# Patient Record
Sex: FEMALE | Race: WHITE
[De-identification: names, ages, dates, MRNs, and addresses within clinical notes are randomized per-mention and may not be internally consistent; named-entity substitution may affect disease eponyms.]

---

## 2021-08-26 ENCOUNTER — HOSPITAL ENCOUNTER (EMERGENCY)
Age: 36
Discharge: HOME | End: 2021-08-26
Payer: SELF-PAY

## 2021-08-26 VITALS
HEART RATE: 71 BPM | DIASTOLIC BLOOD PRESSURE: 77 MMHG | OXYGEN SATURATION: 99 % | RESPIRATION RATE: 18 BRPM | SYSTOLIC BLOOD PRESSURE: 123 MMHG

## 2021-08-26 VITALS
OXYGEN SATURATION: 100 % | HEART RATE: 56 BPM | SYSTOLIC BLOOD PRESSURE: 118 MMHG | DIASTOLIC BLOOD PRESSURE: 76 MMHG | RESPIRATION RATE: 16 BRPM

## 2021-08-26 VITALS
RESPIRATION RATE: 16 BRPM | HEART RATE: 76 BPM | SYSTOLIC BLOOD PRESSURE: 129 MMHG | OXYGEN SATURATION: 96 % | DIASTOLIC BLOOD PRESSURE: 74 MMHG | TEMPERATURE: 98.42 F

## 2021-08-26 VITALS — BODY MASS INDEX: 27.2 KG/M2

## 2021-08-26 DIAGNOSIS — M54.16: Primary | ICD-10-CM

## 2021-08-26 LAB — SP GR UR: 1 (ref 1–1.03)

## 2021-08-26 PROCEDURE — 99283 EMERGENCY DEPT VISIT LOW MDM: CPT

## 2021-08-26 PROCEDURE — 96372 THER/PROPH/DIAG INJ SC/IM: CPT

## 2021-08-26 PROCEDURE — 81001 URINALYSIS AUTO W/SCOPE: CPT

## 2021-08-26 PROCEDURE — 72131 CT LUMBAR SPINE W/O DYE: CPT

## 2024-02-24 ENCOUNTER — HOSPITAL ENCOUNTER (EMERGENCY)
Facility: HOSPITAL | Age: 39
Discharge: HOME | End: 2024-02-24
Attending: INTERNAL MEDICINE

## 2024-02-24 VITALS
TEMPERATURE: 98 F | SYSTOLIC BLOOD PRESSURE: 128 MMHG | BODY MASS INDEX: 30.52 KG/M2 | WEIGHT: 151.38 LBS | DIASTOLIC BLOOD PRESSURE: 72 MMHG | HEART RATE: 74 BPM | OXYGEN SATURATION: 99 % | RESPIRATION RATE: 17 BRPM | HEIGHT: 59 IN

## 2024-02-24 DIAGNOSIS — R07.9 CHEST PAIN: ICD-10-CM

## 2024-02-24 DIAGNOSIS — R07.89 CHEST WALL PAIN: Primary | ICD-10-CM

## 2024-02-24 PROCEDURE — 99283 EMERGENCY DEPT VISIT LOW MDM: CPT

## 2024-02-24 RX ORDER — BUSPIRONE HYDROCHLORIDE 10 MG/1
10 TABLET ORAL 2 TIMES DAILY PRN
COMMUNITY
Start: 2024-01-11

## 2024-02-24 RX ORDER — NAPROXEN 500 MG/1
500 TABLET ORAL 2 TIMES DAILY WITH MEALS
Qty: 30 TABLET | Refills: 0 | Status: SHIPPED | OUTPATIENT
Start: 2024-02-24 | End: 2024-03-10

## 2024-02-24 RX ORDER — FLUOXETINE HYDROCHLORIDE 40 MG/1
40 CAPSULE ORAL EVERY MORNING
COMMUNITY
Start: 2024-01-11

## 2024-02-24 NOTE — ED PROVIDER NOTES
02/24/2024         2:32 PM    Source of History:  History obtained from the patient.     Chief complaint:  From Nurse Triage:  Chest Pain (C/o chest pain x 1 hour while shoveling dirt)    HISTORY OF PRESENT ILLNES:  Lisa Riojas is a 38 y.o. female  has a past medical history of Anxiety disorder, unspecified and Depression. presenting with Chest Pain (C/o chest pain x 1 hour while shoveling dirt)      REVIEW OF SYSTEMS:   Constitutional symptoms:  No Fever. No Chills    Skin symptoms:  No Rash.    Eye symptoms:  No Visual disturbance reported.   ENMT symptoms:  No Sore throat,    Respiratory symptoms:  No Shortness of Breath, no Cough, no Wheezing.    Cardiovascular symptoms:  Left Chest Pain, No Palpitations.   Gastrointestinal symptoms:  No Abdominal Pain, No Nausea, No Vomiting, No Diarrhea, No Constipation.    Genitourinary symptoms:  No Dysuria,    Musculoskeletal symptoms:  No Back pain,    Neurologic symptoms:  No Headache, No Dizziness.    Psychiatric symptoms:  No Anxiety, No Depression, No Substance Abuse.              Additional review of systems information: Patient Denies Any Other Complaints.    All Other Systems Reviewed With Patient And Negative.    ALLEGIES:  Review of patient's allergies indicates:  No Known Allergies    MEDICINE LIST:  Current Outpatient Medications   Medication Instructions    busPIRone (BUSPAR) 10 mg, Oral, 2 times daily PRN    FLUoxetine 40 mg, Oral, Every morning    naproxen (NAPROSYN) 500 mg, Oral, 2 times daily with meals        PMH:  As per HPI and below:    Reviewed and updated in chart.    PAST MEDICAL HISTORY:  Past Medical History:   Diagnosis Date    Anxiety disorder, unspecified     Depression         PAST SURGICAL HISTORY:  Past Surgical History:   Procedure Laterality Date    BILATERAL TUBAL LIGATION Bilateral        SOCIAL HISTORY:  Social History     Tobacco Use    Smoking status: Every Day     Types: Cigarettes    Smokeless tobacco: Never   Substance Use Topics     Alcohol use: Not Currently    Drug use: Never       FAMILY HISTORY:  Family History   Problem Relation Age of Onset    Hyperlipidemia Mother     Heart disease Mother     Hypertension Mother     Hyperlipidemia Father     Hypertension Father     Heart disease Father     Diabetes Maternal Grandmother         PROBLEM LIST:  There is no problem list on file for this patient.       PHYSICAL EXAM:      ED Triage Vitals [02/24/24 1416]   BP (!) 176/90   Pulse 74   Resp 18   Temp 98.2 °F (36.8 °C)   SpO2 100 %        Vital Signs: Reviewed As In Chart.  General:  Alert, No Cardiorespiratory Distress Noted.   Eye:  Extraocular Movements Are Intact.   ENT: Mucus membranes are moist.   Cardiovascular:  Regular Rate And Rhythm, No Murmur, No Pedal Edema.    Respiratory:  Tenderness along the left sternal border, Respirations Nonlabored, No Respiratory Distress, Good Bilateral Air Entry, No Rales, No Rhonchi.    Gastrointestinal:  Soft, Non Distended, Non Tenderness, Normal Bowel Sounds.    Neurological:  Alert And Oriented To Person, Place, Time, And Situation, Normal Motor Observed, Normal Speech Observed.  Musculoskeletal:  No Gross Deformity Noted.     Psychiatric:  Cooperative.      ED WORKUP FOR MEDICAL DECISION MAKING:    ED ORDERS:  Orders Placed This Encounter   Procedures    EKG 12-lead       ED MEDICINES:  Medications - No data to display         ECG Results              EKG 12-lead (Preliminary result)  Result time 02/24/24 14:33:41      Wet Read by Hemant rAauz MD (02/24/24 14:33:41, [facility] - Emergency Dept, Emergency Medicine)    EKG Initial Reading: Independently Interpreted by Hemant Arauz MD. independently as: No STEMI. Rhythm: Normal Sinus Rhythm, Rate 72. Ectopy: No Ectopy. Conduction: Normal. ST Segments: Normal ST Segments. T Waves: Normal. Axis: Normal.                                     ED LABS ORDERED AND REVIEWED:  No results found for any previous visit.       RADIOLOGY  STUDIES ORDERED AND REVIEWED:  Imaging Results    None         MEDICAL DECISION MAKING:    Lisa Riojas is 38 y.o. female who  has a past medical history of Anxiety disorder, unspecified and Depression. arrives in ER with c/o Chest Pain (C/o chest pain x 1 hour while shoveling dirt)  Patient says she was shoveling dirt outside, and developed pain in the left chest, she says she stopped laid down for 15 minutes but was not getting better, then she went and laid down inside for some time, but is not getting better so she decided to come to the emergency room.  She says every time she moves it hurts, denies any other associated symptoms.    Reviewed Nurses Note. Reviewed Vital Signs.     Reviewed Pertinent old records, History and updated as necessary.    Vitals:    02/24/24 1431   BP:    Pulse: 74   Resp: 17   Temp:         Medical Decision Making            ED Course as of 02/24/24 1435   Sat Feb 24, 2024   1434 Patient has reproducible anterior chest wall pain, her EKG is perfectly normal, her blood pressure is down to almost normal, heart rate is in 50s, O2 sat is maintained, she started having chest pain while she was shoveling and it hurts when she moves, it hurts when I touch it, I will prescribe her naproxen for that but I advised her that she can talk to a cardiologist and they can do a stress test on her she is going to talk to her family doctor. [GQ]   1435 BP: 128/72 [GQ]   1435 Pulse: 73 [GQ]   1435 Resp: 19 [GQ]   1435 SpO2: 99 % [GQ]      ED Course User Index  [GQ] Hemant Arauz MD            PROCEDURES PERFORMED IN ED:  Procedures    DIAGNOSTIC IMPRESSION:        ICD-10-CM ICD-9-CM   1. Chest wall pain  R07.89 786.52   2. Chest pain  R07.9 786.50         ED Disposition Condition    Discharge Stable               Medication List        START taking these medications      naproxen 500 MG tablet  Commonly known as: NAPROSYN  Take 1 tablet (500 mg total) by mouth 2 (two) times daily with meals. for 15  days            ASK your doctor about these medications      busPIRone 10 MG tablet  Commonly known as: BUSPAR     FLUoxetine 40 MG capsule               Where to Get Your Medications        You can get these medications from any pharmacy    Bring a paper prescription for each of these medications  naproxen 500 MG tablet           Follow-up Information       PMD In 2 days.               Call  Cardiologist.                              ED Prescriptions       Medication Sig Dispense Start Date End Date Auth. Provider    naproxen (NAPROSYN) 500 MG tablet Take 1 tablet (500 mg total) by mouth 2 (two) times daily with meals. for 15 days 30 tablet 2/24/2024 3/10/2024 Hemant Arauz MD          Follow-up Information       Follow up With Specialties Details Why Contact Info    PMD  In 2 days      Cardiologist  Call                  Hemant Arauz MD  02/24/24 [id number]

## 2024-04-07 ENCOUNTER — HOSPITAL ENCOUNTER (EMERGENCY)
Facility: HOSPITAL | Age: 39
Discharge: HOME | End: 2024-04-07
Attending: INTERNAL MEDICINE

## 2024-04-07 VITALS
BODY MASS INDEX: 31.01 KG/M2 | OXYGEN SATURATION: 98 % | SYSTOLIC BLOOD PRESSURE: 129 MMHG | HEIGHT: 59 IN | DIASTOLIC BLOOD PRESSURE: 73 MMHG | HEART RATE: 69 BPM | TEMPERATURE: 99 F | WEIGHT: 153.81 LBS | RESPIRATION RATE: 20 BRPM

## 2024-04-07 DIAGNOSIS — K08.89 TOOTHACHE: Primary | ICD-10-CM

## 2024-04-07 PROCEDURE — 96372 THER/PROPH/DIAG INJ SC/IM: CPT | Performed by: NURSE PRACTITIONER

## 2024-04-07 PROCEDURE — 63600175 PHARM REV CODE 636 W HCPCS: Performed by: NURSE PRACTITIONER

## 2024-04-07 PROCEDURE — 99284 EMERGENCY DEPT VISIT MOD MDM: CPT | Mod: 25

## 2024-04-07 RX ORDER — IBUPROFEN 600 MG/1
600 TABLET ORAL EVERY 6 HOURS PRN
Qty: 20 TABLET | Refills: 0 | Status: SHIPPED | OUTPATIENT
Start: 2024-04-07

## 2024-04-07 RX ORDER — KETOROLAC TROMETHAMINE 30 MG/ML
60 INJECTION, SOLUTION INTRAMUSCULAR; INTRAVENOUS
Status: COMPLETED | OUTPATIENT
Start: 2024-04-07 | End: 2024-04-07

## 2024-04-07 RX ORDER — AMOXICILLIN 875 MG/1
875 TABLET, FILM COATED ORAL 2 TIMES DAILY
Qty: 20 TABLET | Refills: 0 | Status: SHIPPED | OUTPATIENT
Start: 2024-04-07 | End: 2024-04-17

## 2024-04-07 RX ORDER — TRAMADOL HYDROCHLORIDE 50 MG/1
50 TABLET ORAL EVERY 6 HOURS PRN
Qty: 12 TABLET | Refills: 0 | Status: SHIPPED | OUTPATIENT
Start: 2024-04-07

## 2024-04-07 RX ORDER — CHLORHEXIDINE GLUCONATE ORAL RINSE 1.2 MG/ML
15 SOLUTION DENTAL 2 TIMES DAILY
Qty: 420 ML | Refills: 0 | Status: SHIPPED | OUTPATIENT
Start: 2024-04-07 | End: 2024-04-21

## 2024-04-07 RX ADMIN — KETOROLAC TROMETHAMINE 60 MG: 30 INJECTION, SOLUTION INTRAMUSCULAR at 03:04

## 2024-04-07 NOTE — ED PROVIDER NOTES
Encounter Date: 2024       History     Chief Complaint   Patient presents with    Dental Pain     C/o dental pain to multiple teeth.      Patient is a 38-year-old female who presents to the emergency department with dental pain that has been ongoing for years.  She has poor dentition throughout only has 6 teeth remaining on the lower jaw.  Full dentures to the upper jaw.  Lower jaw has tooth # 27, 25 come 24, 23, 22 remaining.  There is a mild amount of swelling to the soft tissue in the chin.  She denies fevers chills.  She denies any other complaints or associated symptoms at this time.  Denies seeing dentist as she was not from here and is supposed to be heading back to her home town [place] very [place] very soon.      Review of patient's allergies indicates:  No Known Allergies  Past Medical History:   Diagnosis Date    Anxiety disorder, unspecified     Depression      Past Surgical History:   Procedure Laterality Date    BILATERAL TUBAL LIGATION Bilateral      SECTION       Family History   Problem Relation Age of Onset    Hyperlipidemia Mother     Heart disease Mother     Hypertension Mother     Hyperlipidemia Father     Hypertension Father     Heart disease Father     Diabetes Maternal Grandmother      Social History     Tobacco Use    Smoking status: Every Day     Types: Cigarettes    Smokeless tobacco: Never   Substance Use Topics    Alcohol use: Not Currently    Drug use: Never     Review of Systems   Constitutional:  Negative for activity change, appetite change and fever.   HENT:  Positive for dental problem. Negative for congestion, nosebleeds, postnasal drip, rhinorrhea and sore throat.    Eyes:  Negative for discharge and itching.   Respiratory:  Negative for apnea, chest tightness and shortness of breath.    Cardiovascular:  Negative for chest pain.   Gastrointestinal:  Negative for abdominal distention, abdominal pain and nausea.   Genitourinary:  Negative for dysuria, vaginal bleeding,  vaginal discharge and vaginal pain.   Musculoskeletal:  Negative for back pain.   Skin:  Negative for rash.   Neurological:  Negative for dizziness, facial asymmetry and weakness.   Hematological:  Does not bruise/bleed easily.   Psychiatric/Behavioral:  Negative for agitation and behavioral problems.    All other systems reviewed and are negative.      Physical Exam     Initial Vitals [04/07/24 1527]   BP Pulse Resp Temp SpO2   129/73 69 20 98.6 °F (37 °C) 98 %      MAP       --         Physical Exam    Nursing note and vitals reviewed.  Constitutional: Vital signs are normal. She appears well-developed and well-nourished.  Non-toxic appearance. She does not have a sickly appearance.   HENT:   Head: Normocephalic and atraumatic.   Right Ear: External ear normal.   Left Ear: External ear normal.   Only tooth number 27, 25, 24, 23, 22 still remaining on the lower jaw.  Dentures to the upper teeth.  Minimal amount of swelling erythema no major edema.  Soft tissue swelling noted to the chin.  No other abnormality seen.   Eyes: Conjunctivae, EOM and lids are normal. Lids are everted and swept, no foreign bodies found.   Neck: Trachea normal and phonation normal. Neck supple. No thyroid mass and no thyromegaly present.   Normal range of motion.   Full passive range of motion without pain.     Cardiovascular:  Normal rate, regular rhythm, S1 normal, S2 normal, normal heart sounds, intact distal pulses and normal pulses.           Musculoskeletal:      Cervical back: Full passive range of motion without pain, normal range of motion and neck supple.     Lymphadenopathy:     She has no cervical adenopathy.   Neurological: She is alert and oriented to person, place, and time. She has normal strength and normal reflexes.   Skin: Skin is warm, dry and intact. Capillary refill takes less than 2 seconds.   Psychiatric: She has a normal mood and affect. Her speech is normal and behavior is normal. Judgment normal. Cognition and  memory are normal.         ED Course   Procedures  Labs Reviewed - No data to display       Imaging Results    None          Medications   ketorolac injection 60 mg (has no administration in time range)     Medical Decision Making  Patient is a 38-year-old female who presents to the emergency department with dental pain that has been ongoing for years.  She has poor dentition throughout only has 6 teeth remaining on the lower jaw.  Full dentures to the upper jaw.  Lower jaw has tooth # 27, 25 come 24, 23, 22 remaining.  There is a mild amount of swelling to the soft tissue in the chin.  She denies fevers chills.  She denies any other complaints or associated symptoms at this time.  Denies seeing dentist as she was not from here and is supposed to be heading back to her home town in his very show very soon.    Problems Addressed:  Toothache: acute illness or injury     Details: Patient has very poor dentition is only has 6 teeth remaining lower jaw.  There is a minimal amount of swelling to the soft tissue the chin insert minimal swelling of the gums so I can not rule out abscess without dental x-rays.  Will cover with antibiotics and give medication for the pain and some mouthwash to keep oral mucosa clean and moist.  Discussed follow up with dentist.  Strict ED return precautions discussed for any change or worsening symptoms.    Risk  Prescription drug management.                                      Clinical Impression:  Final diagnoses:  [K08.89] Toothache (Primary)          ED Disposition Condition    Discharge Stable          ED Prescriptions       Medication Sig Dispense Start Date End Date Auth. Provider    ibuprofen (ADVIL,MOTRIN) 600 MG tablet Take 1 tablet (600 mg total) by mouth every 6 (six) hours as needed for Pain. 20 tablet 4/7/2024 -- Castro Dunn FNP    traMADoL (ULTRAM) 50 mg tablet Take 1 tablet (50 mg total) by mouth every 6 (six) hours as needed for Pain. 12 tablet 4/7/2024 -- Shaun  MIGUEL Yen    chlorhexidine (PERIDEX) 0.12 % solution Use as directed 15 mLs in the mouth or throat 2 (two) times daily. for 14 days 420 mL 4/7/2024 4/21/2024 Castro Dunn FNP    amoxicillin (AMOXIL) 875 MG tablet Take 1 tablet (875 mg total) by mouth 2 (two) times daily. for 10 days 20 tablet 4/7/2024 4/17/2024 Castro Dunn FNP          Follow-up Information    None          Castro Dunn FNP  04/07/24 2355

## 2024-11-21 ENCOUNTER — HOSPITAL ENCOUNTER (EMERGENCY)
Facility: HOSPITAL | Age: 39
Discharge: HOME | End: 2024-11-21
Attending: EMERGENCY MEDICINE

## 2024-11-21 VITALS
DIASTOLIC BLOOD PRESSURE: 70 MMHG | RESPIRATION RATE: 19 BRPM | HEIGHT: 59 IN | HEART RATE: 88 BPM | WEIGHT: 168 LBS | BODY MASS INDEX: 33.87 KG/M2 | OXYGEN SATURATION: 97 % | SYSTOLIC BLOOD PRESSURE: 121 MMHG | TEMPERATURE: 98 F

## 2024-11-21 DIAGNOSIS — J40 BRONCHITIS: Primary | ICD-10-CM

## 2024-11-21 DIAGNOSIS — J45.909 ASTHMA: ICD-10-CM

## 2024-11-21 LAB
ALBUMIN SERPL-MCNC: 3.6 G/DL (ref 3.5–5)
ALBUMIN/GLOB SERPL: 1.1 RATIO (ref 1.1–2)
ALP SERPL-CCNC: 69 UNIT/L (ref 40–150)
ALT SERPL-CCNC: 11 UNIT/L (ref 0–55)
ANION GAP SERPL CALC-SCNC: 6 MEQ/L
AST SERPL-CCNC: 18 UNIT/L (ref 5–34)
BASOPHILS # BLD AUTO: 0.06 X10(3)/MCL
BASOPHILS NFR BLD AUTO: 1.1 %
BILIRUB SERPL-MCNC: 0.1 MG/DL
BUN SERPL-MCNC: 9 MG/DL (ref 7–18.7)
CALCIUM SERPL-MCNC: 9.1 MG/DL (ref 8.4–10.2)
CHLORIDE SERPL-SCNC: 108 MMOL/L (ref 98–107)
CO2 SERPL-SCNC: 26 MMOL/L (ref 22–29)
CREAT SERPL-MCNC: 0.71 MG/DL (ref 0.55–1.02)
CREAT/UREA NIT SERPL: 13
EOSINOPHIL # BLD AUTO: 0.24 X10(3)/MCL (ref 0–0.9)
EOSINOPHIL NFR BLD AUTO: 4.5 %
ERYTHROCYTE [DISTWIDTH] IN BLOOD BY AUTOMATED COUNT: 13.9 % (ref 11.5–17)
FLUAV AG UPPER RESP QL IA.RAPID: NOT DETECTED
FLUBV AG UPPER RESP QL IA.RAPID: NOT DETECTED
GFR SERPLBLD CREATININE-BSD FMLA CKD-EPI: >60 ML/MIN/1.73/M2
GLOBULIN SER-MCNC: 3.3 GM/DL (ref 2.4–3.5)
GLUCOSE SERPL-MCNC: 65 MG/DL (ref 74–100)
HCT VFR BLD AUTO: 34.1 % (ref 37–47)
HGB BLD-MCNC: 10.7 G/DL (ref 12–16)
IMM GRANULOCYTES # BLD AUTO: 0.01 X10(3)/MCL (ref 0–0.04)
IMM GRANULOCYTES NFR BLD AUTO: 0.2 %
LIPASE SERPL-CCNC: 22 U/L
LYMPHOCYTES # BLD AUTO: 1.43 X10(3)/MCL (ref 0.6–4.6)
LYMPHOCYTES NFR BLD AUTO: 26.8 %
MCH RBC QN AUTO: 25.1 PG (ref 27–31)
MCHC RBC AUTO-ENTMCNC: 31.4 G/DL (ref 33–36)
MCV RBC AUTO: 80 FL (ref 80–94)
MONOCYTES # BLD AUTO: 0.71 X10(3)/MCL (ref 0.1–1.3)
MONOCYTES NFR BLD AUTO: 13.3 %
NEUTROPHILS # BLD AUTO: 2.89 X10(3)/MCL (ref 2.1–9.2)
NEUTROPHILS NFR BLD AUTO: 54.1 %
OHS QRS DURATION: 80 MS
OHS QTC CALCULATION: 412 MS
PLATELET # BLD AUTO: 241 X10(3)/MCL (ref 130–400)
PMV BLD AUTO: 11.2 FL (ref 7.4–10.4)
POTASSIUM SERPL-SCNC: 4.3 MMOL/L (ref 3.5–5.1)
PROT SERPL-MCNC: 6.9 GM/DL (ref 6.4–8.3)
RBC # BLD AUTO: 4.26 X10(6)/MCL (ref 4.2–5.4)
RSV A 5' UTR RNA NPH QL NAA+PROBE: NOT DETECTED
SARS-COV-2 RNA RESP QL NAA+PROBE: NOT DETECTED
SODIUM SERPL-SCNC: 140 MMOL/L (ref 136–145)
TROPONIN I SERPL-MCNC: <0.01 NG/ML (ref 0–0.04)
WBC # BLD AUTO: 5.34 X10(3)/MCL (ref 4.5–11.5)

## 2024-11-21 PROCEDURE — 83690 ASSAY OF LIPASE: CPT | Performed by: EMERGENCY MEDICINE

## 2024-11-21 PROCEDURE — 94640 AIRWAY INHALATION TREATMENT: CPT

## 2024-11-21 PROCEDURE — 25000242 PHARM REV CODE 250 ALT 637 W/ HCPCS: Performed by: EMERGENCY MEDICINE

## 2024-11-21 PROCEDURE — 99285 EMERGENCY DEPT VISIT HI MDM: CPT | Mod: 25

## 2024-11-21 PROCEDURE — 84484 ASSAY OF TROPONIN QUANT: CPT | Performed by: EMERGENCY MEDICINE

## 2024-11-21 PROCEDURE — 85025 COMPLETE CBC W/AUTO DIFF WBC: CPT | Performed by: EMERGENCY MEDICINE

## 2024-11-21 PROCEDURE — 93005 ELECTROCARDIOGRAM TRACING: CPT

## 2024-11-21 PROCEDURE — 93010 ELECTROCARDIOGRAM REPORT: CPT | Mod: ,,, | Performed by: INTERNAL MEDICINE

## 2024-11-21 PROCEDURE — 94760 N-INVAS EAR/PLS OXIMETRY 1: CPT

## 2024-11-21 PROCEDURE — 0241U COVID/RSV/FLU A&B PCR: CPT | Performed by: EMERGENCY MEDICINE

## 2024-11-21 PROCEDURE — 80053 COMPREHEN METABOLIC PANEL: CPT | Performed by: EMERGENCY MEDICINE

## 2024-11-21 RX ORDER — IPRATROPIUM BROMIDE AND ALBUTEROL SULFATE 2.5; .5 MG/3ML; MG/3ML
3 SOLUTION RESPIRATORY (INHALATION)
Status: COMPLETED | OUTPATIENT
Start: 2024-11-21 | End: 2024-11-21

## 2024-11-21 RX ORDER — PREDNISONE 20 MG/1
40 TABLET ORAL DAILY
Qty: 10 TABLET | Refills: 0 | Status: SHIPPED | OUTPATIENT
Start: 2024-11-21 | End: 2024-11-26

## 2024-11-21 RX ORDER — AMOXICILLIN AND CLAVULANATE POTASSIUM 875; 125 MG/1; MG/1
1 TABLET, FILM COATED ORAL 2 TIMES DAILY
Qty: 14 TABLET | Refills: 0 | Status: SHIPPED | OUTPATIENT
Start: 2024-11-21

## 2024-11-21 RX ADMIN — IPRATROPIUM BROMIDE AND ALBUTEROL SULFATE 3 ML: .5; 3 SOLUTION RESPIRATORY (INHALATION) at 09:11

## 2024-11-21 NOTE — ED PROVIDER NOTES
Encounter Date: 2024       History     Chief Complaint   Patient presents with    Shortness of Breath     C/o SOB and burning in her chest x 1 week     HPI  39-year-old female history of asthma, depression, previous  and tubal ligation now with complaints of one-week history of productive cough of yellow sputum, sinus congestion, fever, mild shortness of breath with exertion, chest tightness with no headache, nausea, vomiting, abdominal pain, dysuria, increasing leg edema or calf pain.  Patient is not vaccinated against COVID-19 or influenza.   Review of patient's allergies indicates:  No Known Allergies  Past Medical History:   Diagnosis Date    Anxiety disorder, unspecified     Asthma     Depression      Past Surgical History:   Procedure Laterality Date    BILATERAL TUBAL LIGATION Bilateral      SECTION       Family History   Problem Relation Name Age of Onset    Hyperlipidemia Mother      Heart disease Mother      Hypertension Mother      Hyperlipidemia Father      Hypertension Father      Heart disease Father      Diabetes Maternal Grandmother       Social History     Tobacco Use    Smoking status: Every Day     Types: Cigarettes    Smokeless tobacco: Never   Substance Use Topics    Alcohol use: Not Currently    Drug use: Never     Review of Systems   All other systems reviewed and are negative.      Physical Exam     Initial Vitals [24 0919]   BP Pulse Resp Temp SpO2   131/61 93 20 98.3 °F (36.8 °C) 99 %      MAP       --         Physical Exam    Nursing note and vitals reviewed.  Constitutional: She appears well-developed and well-nourished. She is cooperative.   HENT:   Head: Normocephalic and atraumatic.   Eyes: Conjunctivae and lids are normal.   Neck: Trachea normal. Neck supple.   Normal range of motion.  Cardiovascular:  Normal rate, regular rhythm, normal heart sounds and intact distal pulses.           Pulmonary/Chest: No respiratory distress. She has wheezes. She has no  rhonchi. She has no rales. She exhibits no tenderness.   Abdominal: Abdomen is soft. Bowel sounds are normal. There is no abdominal tenderness.   Musculoskeletal:         General: Normal range of motion.      Cervical back: Normal range of motion and neck supple.     Neurological: She is alert and oriented to person, place, and time. She has normal strength.   Skin: Skin is warm and dry. No rash noted.   Psychiatric: She has a normal mood and affect. Her speech is normal and behavior is normal. Judgment and thought content normal.         ED Course   Procedures  Labs Reviewed   COMPREHENSIVE METABOLIC PANEL - Abnormal       Result Value    Sodium 140      Potassium 4.3      Chloride 108 (*)     CO2 26      Glucose 65 (*)     Blood Urea Nitrogen 9.0      Creatinine 0.71      Calcium 9.1      Protein Total 6.9      Albumin 3.6      Globulin 3.3      Albumin/Globulin Ratio 1.1      Bilirubin Total 0.1      ALP 69      ALT 11      AST 18      eGFR >60      Anion Gap 6.0      BUN/Creatinine Ratio 13     CBC WITH DIFFERENTIAL - Abnormal    WBC 5.34      RBC 4.26      Hgb 10.7 (*)     Hct 34.1 (*)     MCV 80.0      MCH 25.1 (*)     MCHC 31.4 (*)     RDW 13.9      Platelet 241      MPV 11.2 (*)     Neut % 54.1      Lymph % 26.8      Mono % 13.3      Eos % 4.5      Basophil % 1.1      Lymph # 1.43      Neut # 2.89      Mono # 0.71      Eos # 0.24      Baso # 0.06      IG# 0.01      IG% 0.2     COVID/RSV/FLU A&B PCR - Normal    Influenza A PCR Not Detected      Influenza B PCR Not Detected      Respiratory Syncytial Virus PCR Not Detected      SARS-CoV-2 PCR Not Detected      Narrative:     The Xpert Xpress SARS-CoV-2/FLU/RSV plus is a rapid, multiplexed real-time PCR test intended for the simultaneous qualitative detection and differentiation of SARS-CoV-2, Influenza A, Influenza B, and respiratory syncytial virus (RSV) viral RNA in either nasopharyngeal swab or nasal swab specimens.         TROPONIN I - Normal    Troponin-I  <0.010     LIPASE - Normal    Lipase Level 22     CBC W/ AUTO DIFFERENTIAL    Narrative:     The following orders were created for panel order CBC auto differential.  Procedure                               Abnormality         Status                     ---------                               -----------         ------                     CBC with Differential[[id number]]       Abnormal            Final result                 Please view results for these tests on the individual orders.     EKG Readings: (Independently Interpreted)   Normal sinus rhythm 71, no acute ST elevation or ST depressions, normal axis.     ECG Results              EKG 12-lead (Final result)        Collection Time Result Time QRS Duration [facility] QTC Calculation    11/21/24 09:24:00 11/21/24 10:10:42 80 412                     Final result by Interface, Lab In [facility] (11/21/24 10:10:52)                   Narrative:    Test Reason : R06.00,    Vent. Rate :  71 BPM     Atrial Rate :  71 BPM     P-R Int : 140 ms          QRS Dur :  80 ms      QT Int : 380 ms       P-R-T Axes :  53  53  36 degrees    QTcB Int : 412 ms    Normal sinus rhythm  Normal ECG  No previous ECGs available  Confirmed by Soto Stafford ([id number]) on 11/21/2024 10:10:39 AM    Referred By: AAAREFERRAL SELF           Confirmed By: Soto Stafford                                  Imaging Results              X-Ray Chest AP Portable (Final result)  Result time 11/21/24 10:37:07      Final result by James Lange MD (11/21/24 10:37:07)                   Impression:      1. No active cardiopulmonary disease identified      Electronically signed by: James Lange  Date:    11/21/2024  Time:    10:37               Narrative:    EXAMINATION:  XR CHEST AP PORTABLE    CLINICAL HISTORY:  , Unspecified asthma, uncomplicated.    COMPARISON:  None available    FINDINGS:  An AP view or more reveals the heart to be mildly enlarged.  The trachea is midline.  No definite infiltrate or effusion  identified.  Bony structures appear grossly intact.                        Wet Read by Jose Enrique Morocho MD (11/21/24 09:43:24, [facility] - Emergency Dept, Emergency Medicine)    No acute findings                                     Medications   albuterol-ipratropium 2.5 mg-0.5 mg/3 mL nebulizer solution 3 mL (3 mLs Nebulization Given 11/21/24 0939)     Medical Decision Making     39-year-old female history of asthma complaining of one-week history of productive cough, shortness of breath, sinus congestion, fever.  Vital signs stable in ED, afebrile, O2 sat 98% on room air.  Patient with minimal wheezing on exam.  Labs essentially WNL including a normal troponin, WBC count. EKG with no acute ischemic changes.  Patient feeling better after DuoNeb here in ED and will be discharged home with prednisone Rx, continue albuterol inhaler at home, Augmentin Rx, (patient states that Zithromax does not work well for her in past), Tylenol Motrin for fever for pain, close follow up with PCP in 1-2 days for recheck, return for worsening symptoms or any other concerns.                               Clinical Impression:  Final diagnoses:  [J45.909] Asthma  [J40] Bronchitis (Primary)          ED Disposition Condition    Discharge Stable          ED Prescriptions       Medication Sig Dispense Start Date End Date Auth. Provider    predniSONE (DELTASONE) 20 MG tablet Take 2 tablets (40 mg total) by mouth once daily. for 5 days 10 tablet 11/21/2024 11/26/2024 Jose Enrique Morocho MD    amoxicillin-clavulanate 875-125mg (AUGMENTIN) 875-125 mg per tablet Take 1 tablet by mouth 2 (two) times daily. 14 tablet 11/21/2024 -- Jose Enrique Morocho MD          Follow-up Information       Follow up With Specialties Details Why Contact Info        Follow up with your primary care provider in 1-2 days for recheck             Jose Enrique Morocho MD  11/22/24 0675

## 2024-12-10 ENCOUNTER — HOSPITAL ENCOUNTER (EMERGENCY)
Facility: HOSPITAL | Age: 39
Discharge: HOME | End: 2024-12-10
Attending: INTERNAL MEDICINE

## 2024-12-10 VITALS
OXYGEN SATURATION: 99 % | TEMPERATURE: 98 F | HEIGHT: 59 IN | HEART RATE: 65 BPM | BODY MASS INDEX: 32.25 KG/M2 | SYSTOLIC BLOOD PRESSURE: 148 MMHG | DIASTOLIC BLOOD PRESSURE: 86 MMHG | RESPIRATION RATE: 19 BRPM | WEIGHT: 160 LBS

## 2024-12-10 DIAGNOSIS — J45.909 ASTHMA, UNSPECIFIED ASTHMA SEVERITY, UNSPECIFIED WHETHER COMPLICATED, UNSPECIFIED WHETHER PERSISTENT: ICD-10-CM

## 2024-12-10 DIAGNOSIS — J20.8 ACUTE BACTERIAL BRONCHITIS: Primary | ICD-10-CM

## 2024-12-10 DIAGNOSIS — B96.89 ACUTE BACTERIAL BRONCHITIS: Primary | ICD-10-CM

## 2024-12-10 LAB
FLUAV AG UPPER RESP QL IA.RAPID: NOT DETECTED
FLUBV AG UPPER RESP QL IA.RAPID: NOT DETECTED
RSV A 5' UTR RNA NPH QL NAA+PROBE: NOT DETECTED
SARS-COV-2 RNA RESP QL NAA+PROBE: NOT DETECTED
STREP A PCR (OHS): NOT DETECTED

## 2024-12-10 PROCEDURE — 94640 AIRWAY INHALATION TREATMENT: CPT

## 2024-12-10 PROCEDURE — 25000242 PHARM REV CODE 250 ALT 637 W/ HCPCS

## 2024-12-10 PROCEDURE — 87651 STREP A DNA AMP PROBE: CPT | Performed by: INTERNAL MEDICINE

## 2024-12-10 PROCEDURE — 94761 N-INVAS EAR/PLS OXIMETRY MLT: CPT

## 2024-12-10 PROCEDURE — 99284 EMERGENCY DEPT VISIT MOD MDM: CPT | Mod: 25

## 2024-12-10 PROCEDURE — 0241U COVID/RSV/FLU A&B PCR: CPT | Performed by: INTERNAL MEDICINE

## 2024-12-10 RX ORDER — DOXYCYCLINE 100 MG/1
100 CAPSULE ORAL 2 TIMES DAILY
Qty: 10 CAPSULE | Refills: 0 | Status: SHIPPED | OUTPATIENT
Start: 2024-12-10 | End: 2024-12-15

## 2024-12-10 RX ORDER — IPRATROPIUM BROMIDE AND ALBUTEROL SULFATE 2.5; .5 MG/3ML; MG/3ML
3 SOLUTION RESPIRATORY (INHALATION)
Status: COMPLETED | OUTPATIENT
Start: 2024-12-10 | End: 2024-12-10

## 2024-12-10 RX ORDER — BENZONATATE 100 MG/1
100 CAPSULE ORAL 3 TIMES DAILY PRN
Qty: 21 CAPSULE | Refills: 0 | Status: SHIPPED | OUTPATIENT
Start: 2024-12-10 | End: 2024-12-17

## 2024-12-10 RX ADMIN — IPRATROPIUM BROMIDE AND ALBUTEROL SULFATE 3 ML: 2.5; .5 SOLUTION RESPIRATORY (INHALATION) at 03:12

## 2024-12-10 NOTE — ED PROVIDER NOTES
Encounter Date: 12/10/2024       History     Chief Complaint   Patient presents with    Fever    Sore Throat    Cough    Shortness of Breath    Headache    poor appetite     States seen here 2 weeks ago dx with Bronchitis but the symptoms have not gotten any better. Having cough, SOB, sore throat, headache, and poor appetite      See MDM.    The history is provided by the patient. No  was used.     Review of patient's allergies indicates:  No Known Allergies  Past Medical History:   Diagnosis Date    Anxiety disorder, unspecified     Asthma     Depression      Past Surgical History:   Procedure Laterality Date    BILATERAL TUBAL LIGATION Bilateral      SECTION       Family History   Problem Relation Name Age of Onset    Hyperlipidemia Mother      Heart disease Mother      Hypertension Mother      Hyperlipidemia Father      Hypertension Father      Heart disease Father      Diabetes Maternal Grandmother       Social History     Tobacco Use    Smoking status: Every Day     Types: Cigarettes    Smokeless tobacco: Never   Substance Use Topics    Alcohol use: Not Currently    Drug use: Never     Review of Systems   Constitutional:         Cough, sore throat, headache, decreased p.o., subjective fever,    All other systems reviewed and are negative.      Physical Exam     Initial Vitals [12/10/24 1230]   BP Pulse Resp Temp SpO2   (!) 156/87 77 16 98.2 °F (36.8 °C) 97 %      MAP       --         Physical Exam    Nursing note and vitals reviewed.  Constitutional: She appears well-developed and well-nourished.   HENT:   Head: Normocephalic and atraumatic. Mouth/Throat: Uvula is midline and oropharynx is clear and moist.   Eyes: EOM are normal.   Neck:   Normal range of motion.  Cardiovascular:  Normal rate and regular rhythm.           Pulmonary/Chest: Effort normal and breath sounds normal.   Abdominal: Abdomen is soft. Bowel sounds are normal. There is no abdominal tenderness.   Musculoskeletal:  "        General: Normal range of motion.      Cervical back: Normal range of motion.     Neurological: She is alert and oriented to person, place, and time.   Skin: Skin is warm and dry.   Psychiatric: She has a normal mood and affect.         ED Course   Procedures  Labs Reviewed   COVID/RSV/FLU A&B PCR - Normal       Result Value    Influenza A PCR Not Detected      Influenza B PCR Not Detected      Respiratory Syncytial Virus PCR Not Detected      SARS-CoV-2 PCR Not Detected      Narrative:     The Xpert Xpress SARS-CoV-2/FLU/RSV plus is a rapid, multiplexed real-time PCR test intended for the simultaneous qualitative detection and differentiation of SARS-CoV-2, Influenza A, Influenza B, and respiratory syncytial virus (RSV) viral RNA in either nasopharyngeal swab or nasal swab specimens.         STREP GROUP A BY PCR - Normal    STREP A PCR ([facility]) Not Detected      Narrative:     The Xpert Xpress Strep A test is a rapid, qualitative in vitro diagnostic test for the detection of Streptococcus pyogenes (Group A ß-hemolytic Streptococcus, Strep A) in throat swab specimens from patients with signs and symptoms of pharyngitis.            Imaging Results    None          Medications   albuterol-ipratropium 2.5 mg-0.5 mg/3 mL nebulizer solution 3 mL (3 mLs Nebulization Given 12/10/24 1550)     Medical Decision Making  The patient is a 39 y.o. female with a pertinent PMHX of asthma who presents to the Emergency Department with no one with a chief complaint of headache, cough, sore throat. Symptoms began 4 weeks ago and have been constant since onset. The throat pain is currently rated as a 4/10 in severity and described as scratchy with no radiation. Associated symptoms include decreased p.o., feeling like "my chest is on fire". Symptoms are aggravated with nothing and alleviated with nothing. The patient denies vomiting, constipation, sick contacts. They report taking OTC cold medications, Augmentin, steroids prior with " some relief of symptoms. No other reported symptoms at this time.  Patient states at baseline she has occasional shortness of breath due to the asthma but it is not worse at this time.  Patient also noted to be seen in the ER for the exact same complaints on 11/21.  At this time she was noted to have a negative extensive workup including CBC, CMP, CXR, swabs, EKG.    Pertinent physical exam findings include the things.  Vital signs stable.  COVID, RSV, flu, strep negative.    Laboratory findings discussed with patient.  Discussed with patient ordering same workup as she received 1 month ago including labs, and imaging.  Patient stated she does not believe this is necessary at this time.  Patient states that the breathing treatment and antibiotics she received previously helped with her symptoms for some time.  Due to patient having symptoms for approximately 4 weeks suggesting bacterial infection, in addition to her history of asthma will treat with antibiotics.  Patient given nebulizer treatment while in the ER with improvement in symptoms.   Patient verbalized understanding and agreement of plan.  Discharge instructions and return precautions provided.  All questions answered.      Amount and/or Complexity of Data Reviewed  Labs: ordered. Decision-making details documented in ED Course.    Risk  Prescription drug management.      Additional MDM:   Differential Diagnosis:   Other: The following diagnoses were also considered and will be evaluated: COVID, Influenza and Strep.                                   Clinical Impression:  Final diagnoses:  [J20.8, B96.89] Acute bacterial bronchitis (Primary)  [J45.909] Asthma, unspecified asthma severity, unspecified whether complicated, unspecified whether persistent          ED Disposition Condition    Discharge Stable          ED Prescriptions       Medication Sig Dispense Start Date End Date Auth. Provider    doxycycline (VIBRAMYCIN) 100 MG Cap Take 1 capsule (100 mg  total) by mouth 2 (two) times daily. for 5 days 10 capsule 12/10/2024 12/15/2024 Milla Jules PA-C    benzonatate (TESSALON) 100 MG capsule Take 1 capsule (100 mg total) by mouth 3 (three) times daily as needed for Cough. 21 capsule 12/10/2024 12/17/2024 Milla Jules PA-C          Follow-up Information       Follow up With Specialties Details Why Contact Info    Primary care provider  Call in 1 week For follow up              Milla Jules PA-C  12/13/24 [id number]

## 2024-12-10 NOTE — DISCHARGE INSTRUCTIONS
Take antibiotics as prescribed.  Take Tessalon as needed for cough.  Stay well hydrated.  If you experience any new or worsening symptoms return to the emergency department.  Follow up with your primary care provider.